# Patient Record
Sex: FEMALE | Race: WHITE | NOT HISPANIC OR LATINO | Employment: UNEMPLOYED | URBAN - METROPOLITAN AREA
[De-identification: names, ages, dates, MRNs, and addresses within clinical notes are randomized per-mention and may not be internally consistent; named-entity substitution may affect disease eponyms.]

---

## 2017-05-24 ENCOUNTER — AMBULATORY - HEALTHEAST (OUTPATIENT)
Dept: PALLIATIVE MEDICINE | Facility: OTHER | Age: 52
End: 2017-05-24

## 2017-05-24 DIAGNOSIS — G89.4 CHRONIC PAIN SYNDROME: ICD-10-CM

## 2017-10-20 ENCOUNTER — RECORDS - HEALTHEAST (OUTPATIENT)
Dept: LAB | Facility: CLINIC | Age: 52
End: 2017-10-20

## 2017-10-20 LAB
CHOLEST SERPL-MCNC: 173 MG/DL
FASTING STATUS PATIENT QL REPORTED: NORMAL
HDLC SERPL-MCNC: 65 MG/DL
LDLC SERPL CALC-MCNC: 88 MG/DL
TRIGL SERPL-MCNC: 98 MG/DL

## 2017-11-30 ENCOUNTER — AMBULATORY - HEALTHEAST (OUTPATIENT)
Dept: PALLIATIVE MEDICINE | Facility: OTHER | Age: 52
End: 2017-11-30

## 2017-11-30 DIAGNOSIS — G89.29 CHRONIC KNEE PAIN: ICD-10-CM

## 2017-11-30 DIAGNOSIS — G89.29 CHRONIC BACK PAIN: ICD-10-CM

## 2017-11-30 DIAGNOSIS — M54.9 CHRONIC BACK PAIN: ICD-10-CM

## 2017-11-30 DIAGNOSIS — M25.569 CHRONIC KNEE PAIN: ICD-10-CM

## 2017-12-15 ENCOUNTER — AMBULATORY - HEALTHEAST (OUTPATIENT)
Dept: BEHAVIORAL HEALTH | Facility: CLINIC | Age: 52
End: 2017-12-15

## 2017-12-15 DIAGNOSIS — F33.1 MAJOR DEPRESSIVE DISORDER, RECURRENT EPISODE, MODERATE (H): ICD-10-CM

## 2018-02-05 ENCOUNTER — RECORDS - HEALTHEAST (OUTPATIENT)
Dept: LAB | Facility: CLINIC | Age: 53
End: 2018-02-05

## 2018-02-06 LAB
AMPHETAMINES UR QL SCN: ABNORMAL
BARBITURATES UR QL: ABNORMAL
BENZODIAZ UR QL: ABNORMAL
CANNABINOIDS UR QL SCN: ABNORMAL
COCAINE UR QL: ABNORMAL
CREAT UR-MCNC: 60.1 MG/DL
OPIATES UR QL SCN: ABNORMAL
OXYCODONE UR QL: ABNORMAL
PCP UR QL SCN: ABNORMAL

## 2018-06-20 ENCOUNTER — HOSPITAL ENCOUNTER (EMERGENCY)
Facility: CLINIC | Age: 53
Discharge: HOME | End: 2018-06-20
Attending: EMERGENCY MEDICINE | Admitting: EMERGENCY MEDICINE
Payer: COMMERCIAL

## 2018-06-20 VITALS
OXYGEN SATURATION: 99 % | DIASTOLIC BLOOD PRESSURE: 79 MMHG | TEMPERATURE: 98.8 F | SYSTOLIC BLOOD PRESSURE: 136 MMHG | RESPIRATION RATE: 16 BRPM | HEART RATE: 116 BPM | HEIGHT: 64 IN
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
## 2018-06-20 DIAGNOSIS — R59.1 LA (LYMPHADENOPATHY): ICD-10-CM

## 2018-06-20 DIAGNOSIS — S00.06XA TICK BITE OF SCALP, INITIAL ENCOUNTER: ICD-10-CM

## 2018-06-20 PROCEDURE — G0463 HOSPITAL OUTPT CLINIC VISIT: HCPCS

## 2018-06-20 PROCEDURE — 99281 EMR DPT VST MAYX REQ PHY/QHP: CPT

## 2018-06-20 PROCEDURE — 99213 OFFICE O/P EST LOW 20 MIN: CPT | Mod: Z6 | Performed by: EMERGENCY MEDICINE

## 2018-06-20 RX ORDER — IBUPROFEN 800 MG/1
800 TABLET, FILM COATED ORAL EVERY 8 HOURS PRN
Qty: 30 TABLET | Refills: 0 | Status: SHIPPED | OUTPATIENT
Start: 2018-06-20 | End: 2018-06-28

## 2018-06-20 RX ORDER — DOXYCYCLINE 100 MG/1
100 CAPSULE ORAL 2 TIMES DAILY
Qty: 28 CAPSULE | Refills: 0 | Status: SHIPPED | OUTPATIENT
Start: 2018-06-20 | End: 2018-07-04

## 2018-06-20 NOTE — DISCHARGE INSTRUCTIONS
"  Tick Bites  Ticks are small arachnids that feed on the blood of rodents, rabbits, birds, deer, dogs, and people. A tick bite may cause a reaction like a spider bite. You may have redness, itching, and slight swelling at the site. Sometimes you may have no reaction where the tick bit you.  Ticks may gorge themselves for days before you find and remove them. The bites themselves aren't cause for concern. But ticks can carry and pass on illnesses such as Lyme disease and [place] spotted fever. Both diseases begin with a rash and symptoms similar to the flu. In advanced stages, these diseases can be quite serious.     A \"bull's eye\" rash is a common symptom of Lyme disease.   When to go to the emergency room (ER)  Not all ticks carry disease. And a tick must stay attached for at least 24 hours to infect you. If you find a tick, don't panic. Try to carefully remove it with tweezers. Grasp the tick near its head and pull without twisting. If you can't easily dislodge the tick or if you leave the head in your skin, get medical care right away.  What to expect in the ER    The tick or any parts of the tick will be removed and the bite will be cleaned.    To prevent disease, you may be given antibiotics. Both Lyme disease and [place] spotted fever respond quickly to these medicines.    You may be asked to see your healthcare provider for a blood test to check for Lyme disease.  Follow-up care  Some states and [facility] have services that test ticks for Lyme disease and other diseases. Check with your local officials to see if this service is available in your area.  If you remove a tick yourself, watch for signs of a tick-borne illness. Symptoms may show up within a few days or weeks after a bite. Call your healthcare provider if you notice any of the following:    Rash. The rash may spread outward in a ring from a hard white lump. Or it may move up your arms and legs to your chest.    Chills and fever    Body " aches and joint pain    Severe headache  Date Last Reviewed: 12/1/2016 2000-2017 [employer]. [address] [place] [address], [place] [address]. All rights reserved. This information is not intended as a substitute for professional medical care. Always follow your healthcare professional's instructions.

## 2018-06-20 NOTE — ED NOTES
"Pt removed a tick from her head \" 2-3 days ago \" pt has dull HA, \" with multiple lumps and bumps all over my head \" has been taking ibuprofen for HA, some relief  "

## 2018-06-20 NOTE — ED AVS SNAPSHOT
" [facility] Emergency Department    [address]    [facility] [address]    Phone:  [phone]    Fax:  [phone]                                       Sujata Pickard   MRN: [id number]    Department:  [facility] Emergency Department   Date of Visit:  6/20/2018           Patient Information     Date Of Birth          1965        Your diagnoses for this visit were:     Tick bite of scalp, initial encounter     LA (lymphadenopathy)        You were seen by Forrest Steinberg MD.      Follow-up Information     Follow up with Elidia Hdez    Specialty:  Family Practice    Why:  As needed    Contact information:    [facility]  [address]  [place] [address]  [phone]          Discharge Instructions         Tick Bites  Ticks are small arachnids that feed on the blood of rodents, rabbits, birds, deer, dogs, and people. A tick bite may cause a reaction like a spider bite. You may have redness, itching, and slight swelling at the site. Sometimes you may have no reaction where the tick bit you.  Ticks may gorge themselves for days before you find and remove them. The bites themselves aren't cause for concern. But ticks can carry and pass on illnesses such as Lyme disease and [place] spotted fever. Both diseases begin with a rash and symptoms similar to the flu. In advanced stages, these diseases can be quite serious.     A \"bull's eye\" rash is a common symptom of Lyme disease.   When to go to the emergency room (ER)  Not all ticks carry disease. And a tick must stay attached for at least 24 hours to infect you. If you find a tick, don't panic. Try to carefully remove it with tweezers. Grasp the tick near its head and pull without twisting. If you can't easily dislodge the tick or if you leave the head in your skin, get medical care right away.  What to expect in the ER    The tick or any parts of the tick will be removed and the bite will be cleaned.    To prevent " disease, you may be given antibiotics. Both Lyme disease and [place] spotted fever respond quickly to these medicines.    You may be asked to see your healthcare provider for a blood test to check for Lyme disease.  Follow-up care  Some [facility] and [facility] have services that test ticks for Lyme disease and other diseases. Check with your local officials to see if this service is available in your area.  If you remove a tick yourself, watch for signs of a tick-borne illness. Symptoms may show up within a few days or weeks after a bite. Call your healthcare provider if you notice any of the following:    Rash. The rash may spread outward in a ring from a hard white lump. Or it may move up your arms and legs to your chest.    Chills and fever    Body aches and joint pain    Severe headache  Date Last Reviewed: 12/1/2016 2000-2017 [employer]. [address] [place] [address]. All rights reserved. This information is not intended as a substitute for professional medical care. Always follow your healthcare professional's instructions.          Discharge References/Attachments     LYMPHADENOPATHY (ENGLISH)      24 Hour Appointment Hotline       To make an appointment at any [facility] clinic, call [phone] ([phone]). If you don't have a family doctor or clinic, we will help you find one. [facility] clinics are conveniently located to serve the needs of you and your family.             Review of your medicines      START taking        Dose / Directions Last dose taken    doxycycline 100 MG capsule   Commonly known as:  VIBRAMYCIN   Dose:  100 mg   Quantity:  28 capsule        Take 1 capsule (100 mg) by mouth 2 times daily for 14 days   Refills:  0          Our records show that you are taking the medicines listed below. If these are incorrect, please call your family doctor or clinic.        Dose / Directions Last dose taken    aspirin 81 MG tablet   Dose:  81 mg        Take 81 mg  by mouth daily   Refills:  0        ATENOLOL PO   Dose:  50 mg        Take 50 mg by mouth daily   Refills:  0        fentaNYL 12 mcg/hr 72 hr patch   Commonly known as:  DURAGESIC   Dose:  1 patch        Place 1 patch onto the skin every 72 hours   Refills:  0        LIPITOR PO   Dose:  20 mg        Take 20 mg by mouth daily   Refills:  0        LISINOPRIL PO   Dose:  20 mg        Take 20 mg by mouth daily   Refills:  0        OMEPRAZOLE PO   Dose:  20 mg        Take 20 mg by mouth daily   Refills:  0        PROPRANOLOL HCL PO   Indication:  High Blood Pressure Disorder        Refills:  0        SEROQUEL PO   Indication:  Trouble Sleeping        Take by mouth At Bedtime   Refills:  0        XANAX PO   Dose:  1 mg        Take 1 mg by mouth Every 6-8 hours as needed   Refills:  0          ASK your doctor about these medications        Dose / Directions Last dose taken    * IBUPROFEN PO   Dose:  600 mg   What changed:  Another medication with the same name was added. Make sure you understand how and when to take each.   Ask about: Which instructions should I use?        Take 600 mg by mouth every 6 hours as needed   Refills:  0        * ibuprofen 800 MG tablet   Commonly known as:  ADVIL/MOTRIN   Dose:  800 mg   What changed:  You were already taking a medication with the same name, and this prescription was added. Make sure you understand how and when to take each.   Quantity:  30 tablet   Ask about: Which instructions should I use?        Take 1 tablet (800 mg) by mouth every 8 hours as needed for moderate pain   Refills:  0        * Notice:  This list has 2 medication(s) that are the same as other medications prescribed for you. Read the directions carefully, and ask your doctor or other care provider to review them with you.            Prescriptions were sent or printed at these locations (2 Prescriptions)                   [facility] [place], [place] - [address]   [address], [place]  "[place] [address]    Telephone:  [phone]   Fax:  [phone]   Hours:                  E-Prescribed (2 of 2)         doxycycline (VIBRAMYCIN) 100 MG capsule               ibuprofen (ADVIL/MOTRIN) 800 MG tablet                Orders Needing Specimen Collection     None      Pending Results     No orders found from 2018 to 2018.            Pending Culture Results     No orders found from 2018 to 2018.            Pending Results Instructions     If you had any lab results that were not finalized at the time of your Discharge, you can call the ED Lab Result RN at [phone]. You will be contacted by this team for any positive Lab results or changes in treatment. The nurses are available 7 days a week from 10A to 6:30P.  You can leave a message 24 hours per day and they will return your call.        Test Results From Your Hospital Stay               Thank you for choosing [facility]       Thank you for choosing [facility] for your care. Our goal is always to provide you with excellent care. Hearing back from our patients is one way we can continue to improve our services. Please take a few minutes to complete the written survey that you may receive in the mail after you visit with us. Thank you!        [employer] Information     [employer] lets you send messages to your doctor, view your test results, renew your prescriptions, schedule appointments and more. To sign up, go to [email or web address] . Click on \"Log in\" on the left side of the screen, which will take you to the Welcome page. Then click on \"Sign up Now\" on the right side of the page.     You will be asked to enter the access code listed below, as well as some personal information. Please follow the directions to create your username and password.     Your access code is: [email or web address]  Expires: 2018  6:28 PM     Your access code will  in 90 days. If you need help or a new code, please call your [facility] clinic or [phone].      "   Care EveryWhere ID     This is your Care EveryWhere ID. This could be used by other organizations to access your [facility] medical records  [id number]        Equal Access to Services     CONSTANCE CASTILLO : Jackelyn Tracy, axel penny, conrado kim, pierre segal. So [facility] [phone].    ATENCIÓN: Si habla español, tiene a augustine disposición servicios gratuitos de asistencia lingüística. Llame al [phone].    We comply with applicable federal civil rights laws and [place] laws. We do not discriminate on the basis of race, color, national origin, age, disability, sex, sexual orientation, or gender identity.            After Visit Summary       This is your record. Keep this with you and show to your community pharmacist(s) and doctor(s) at your next visit.

## 2018-06-20 NOTE — ED PROVIDER NOTES
"  History     Chief Complaint   Patient presents with     Headache     has multiple lumps on head, one is possibly from a tick bite. has a headache, does not have hx of migraines     HPI  Sujata Pickard is a 52 year old female who presents with complaints of tender lumps in her neck.  Patient states about a week ago they removed a engorged tick from her scalp in the occiput.  Since that time she said swelling discomfort in the area and now was noted lumps in the left side of her neck.  Associated headache.  No visual change.  No difficulty speech or swallowing.  Denies chest pain or shortness of breath.  No lightheadedness or generalized weakness.  No abdominal pain, nausea vomiting.  No other skin rash or lesions.  No fever or chills. Patient claims tetanus is up-to-date.  No treatment for symptoms prior to arrival.  Headache is a dull ache in moderate intensity.  No history of migraine.  She denies any other skin rash or lesion.  She has been taking ibuprofen with improvement.  She is a daily smoker.    Problem List:    There are no active problems to display for this patient.       Past Medical History:    Past Medical History:   Diagnosis Date     Arthritis        Past Surgical History:    Past Surgical History:   Procedure Laterality Date     GYN SURGERY         Family History:    No family history on file.    Social History:  Marital Status:   [2]  Social History   Substance Use Topics     Smoking status: Current Every Day Smoker     Types: Cigarettes     Smokeless tobacco: Not on file     Alcohol use Yes      Comment: occas.        Medications:      doxycycline (VIBRAMYCIN) 100 MG capsule   ibuprofen (ADVIL/MOTRIN) 800 MG tablet         Review of Systems other systems reviewed and are negative.  Physical Exam   BP: 136/79  Pulse: 116  Temp: 98.8  F (37.1  C)  Resp: 16  Height: 162.6 cm (5' 4\")  SpO2: 99 %      Physical Exam alert cooperative female in mild to moderate distress.  HEENT shows no " facial swelling or asymmetry.  Ears are clear bilaterally.  Eyes show no injection or mattering.  Nasal packs are patent.  Orally there is no tonsillar hypertrophy or exudate.  Speech is clear and concise.  On scalp exam she has a mildly tender, erythematous nonfluctuant area in the left occiput.  No other scalp lesions are noted.  On neck exam she has tender posterior adenopathy on the left.  No meningismus.  Lungs and cardiac auscultation were normal.  Neurologically nonfocal.    ED Course     ED Course     Procedures               Critical Care time:  none               No results found for this or any previous visit (from the past 24 hour(s)).    Medications - No data to display    Assessments & Plan (with Medical Decision Making)   Patient is a 52-year-old female presents with headache and tender lymph nodes on the left side of her neck.  Patient states that about a week ago they removed the engorged tick from the occiput of her scalp.  Since that time she has had a tender area over that site.  Mild associated headache in the area.  And now is noted some bumps on her left neck which are tender.  She denies any fever chills.  No change in her vision, hearing, speech or swallowing.  Symptoms improved with ibuprofen.  She denies chest pain shortness of breath.  No generalized weakness or lightheadedness.  No focal neurologic symptoms.  No skin rash or lesions.  Tetanus up-to-date.  On exam she is afebrile and vitally stable except mildly tachycardic.  She has a mildly tender area on the left occiput which is nonfluctuant but mildly erythematous.  Neck exam reveals no meningismus or stridor.  She has tender posterior cervical adenopathy on the left.  Neurologically she has a nonfocal exam.  No other skin rashes are appreciated.  Suspect she has erythema migrans with associated cervical lymphadenopathy.  Is given information on both tick bite and lymphadenopathy.  Doxycycline as directed.  Ibuprofen for pain.   Reasons to return to the emergency room were discussed.  Patient and her significant other are in agreement with the plan.  I have reviewed the nursing notes.    I have reviewed the findings, diagnosis, plan and need for follow up with the patient.       Discharge Medication List as of 6/20/2018  6:30 PM      START taking these medications    Details   doxycycline (VIBRAMYCIN) 100 MG capsule Take 1 capsule (100 mg) by mouth 2 times daily for 14 days, Disp-28 capsule, R-0, E-Prescribe      !! ibuprofen (ADVIL/MOTRIN) 800 MG tablet Take 1 tablet (800 mg) by mouth every 8 hours as needed for moderate pain, Disp-30 tablet, R-0, E-Prescribe       !! - Potential duplicate medications found. Please discuss with provider.          Final diagnoses:   Tick bite of scalp, initial encounter   LA (lymphadenopathy)       6/20/2018   [facility] EMERGENCY DEPARTMENT     Forrest Steinberg MD  06/20/18 6576

## 2018-06-20 NOTE — ED AVS SNAPSHOT
[facility] Emergency Department    [address] [address]    Phone:  [phone]    Fax:  [phone]                                       Sujata Pickard   MRN: [id number]    Department:  [facility] Emergency Department   Date of Visit:  6/20/2018           After Visit Summary Signature Page     I have received my discharge instructions, and my questions have been answered. I have discussed any challenges I see with this plan with the nurse or doctor.    ..........................................................................................................................................  Patient/Patient Representative Signature      ..........................................................................................................................................  Patient Representative Print Name and Relationship to Patient    ..................................................               ................................................  Date                                            Time    ..........................................................................................................................................  Reviewed by Signature/Title    ...................................................              ..............................................  Date                                                            Time

## 2021-05-24 ENCOUNTER — RECORDS - HEALTHEAST (OUTPATIENT)
Dept: ADMINISTRATIVE | Facility: CLINIC | Age: 56
End: 2021-05-24

## 2021-06-13 ENCOUNTER — HOSPITAL ENCOUNTER (EMERGENCY)
Facility: CLINIC | Age: 56
Discharge: HOME | End: 2021-06-13
Attending: FAMILY MEDICINE | Admitting: FAMILY MEDICINE

## 2021-06-13 VITALS
RESPIRATION RATE: 16 BRPM | HEIGHT: 63 IN | TEMPERATURE: 97.9 F | DIASTOLIC BLOOD PRESSURE: 79 MMHG | SYSTOLIC BLOOD PRESSURE: 129 MMHG | WEIGHT: 150 LBS | BODY MASS INDEX: 26.58 KG/M2 | HEART RATE: 89 BPM | OXYGEN SATURATION: 97 %

## 2021-06-13 DIAGNOSIS — L03.116 LEFT LEG CELLULITIS: ICD-10-CM

## 2021-06-13 DIAGNOSIS — M79.89 SWELLING OF BOTH LOWER EXTREMITIES: ICD-10-CM

## 2021-06-13 PROCEDURE — 99283 EMERGENCY DEPT VISIT LOW MDM: CPT | Performed by: FAMILY MEDICINE

## 2021-06-13 PROCEDURE — 99284 EMERGENCY DEPT VISIT MOD MDM: CPT | Performed by: FAMILY MEDICINE

## 2021-06-13 RX ORDER — CEPHALEXIN 500 MG/1
500 CAPSULE ORAL 4 TIMES DAILY
Qty: 40 CAPSULE | Refills: 0 | Status: SHIPPED | OUTPATIENT
Start: 2021-06-13 | End: 2023-08-04

## 2021-06-13 ASSESSMENT — MIFFLIN-ST. JEOR: SCORE: 1234.53

## 2021-06-14 NOTE — ED PROVIDER NOTES
"  HPI   The patient is a 55-year-old female presenting with concern for a recurring right leg wound that has now worsened.  She bumped her left anterior leg on the edge of a coffee table about 6 months ago.  She tells me that it has been recurring every time she bumps it.  Over the past few days she describes increased redness and swelling and tenderness.  No purulent drainage.  No fever or other systemic symptoms.  Pain moderate but severe with palpation.        Allergies:  Allergies   Allergen Reactions     Levaquin [Levofloxacin]      Sulfa Drugs      Problem List:    There are no active problems to display for this patient.     Past Medical History:    Past Medical History:   Diagnosis Date     Arthritis      Past Surgical History:    Past Surgical History:   Procedure Laterality Date     GYN SURGERY       Family History:    No family history on file.  Social History:  Marital Status:   [2]  Social History     Tobacco Use     Smoking status: Current Every Day Smoker     Types: Cigarettes   Substance Use Topics     Alcohol use: Yes     Comment: occas.     Drug use: Yes     Comment: benzo and opiate abuse      Medications:    cephALEXin (KEFLEX) 500 MG capsule      Review of Systems   All other systems reviewed and are negative.      PE   BP: (!) 177/120  Pulse: 107  Temp: 97.9  F (36.6  C)  Resp: 16  Height: 160 cm (5' 3\")  Weight: 68 kg (150 lb)  SpO2: 98 %  Physical Exam  Vitals signs reviewed.   Constitutional:       General: She is not in acute distress.     Appearance: She is well-developed.   HENT:      Head: Normocephalic and atraumatic.      Right Ear: External ear normal.      Left Ear: External ear normal.      Nose: Nose normal.      Mouth/Throat:      Mouth: Mucous membranes are moist.      Pharynx: Oropharynx is clear.   Eyes:      Extraocular Movements: Extraocular movements intact.      Conjunctiva/sclera: Conjunctivae normal.      Pupils: Pupils are equal, round, and reactive to light. "   Neck:      Musculoskeletal: Normal range of motion.   Cardiovascular:      Rate and Rhythm: Normal rate and regular rhythm.   Pulmonary:      Effort: Pulmonary effort is normal.   Musculoskeletal: Normal range of motion.   Skin:     General: Skin is warm and dry.      Comments: See photo.  No fluctuance.  Extremely tender.  No drainage.   Neurological:      Mental Status: She is alert and oriented to person, place, and time.   Psychiatric:         Behavior: Behavior normal.             ED COURSE and [facility]   2147.  The patient has recurring open wound.  Has pain, redness, tenderness.  Keflex provided.  Follow-up with family medicine, referral order placed.  Bilateral leg swelling as well, ongoing for months.  Progressively worsened.  No chest pain or shortness of breath.  Low concern for PE.  Low concern for DVT.  She needs broader work-up and treatment options.    LABS  Labs Ordered and Resulted from Time of ED Arrival Up to the Time of Departure from the ED - No data to display    IMAGING  Images reviewed by me.  Radiology report also reviewed.  No orders to display       Procedures    Medications - No data to display      IMPRESSION       ICD-10-CM    1. Left leg cellulitis  L03.116 FAMILY PRACTICE REFERRAL   2. Swelling of both lower extremities  M79.89 FAMILY PRACTICE REFERRAL            Medication List      Started    cephALEXin 500 MG capsule  Commonly known as: KEFLEX  500 mg, Oral, 4 TIMES DAILY                          Eugene Deutsch MD  06/13/21 2142

## 2021-06-14 NOTE — DISCHARGE INSTRUCTIONS
Return to the Emergency Room if the following occurs:     Severely worsened pain, expanding redness and fever, or for any concern at anytime.    Or, follow-up with the following provider as we discussed:     I placed an order for family medicine referral.  Expect a phone call within the next 2 business days to help with scheduling.    Medications discussed:    Keflex.  Start the prescription tonight.    If you received pain-relieving or sedating medication during your time in the ER, avoid alcohol, driving automobiles, or working with machinery.  Also, a responsible adult must stay with you.        Call the Nurse Advice Line at [phone] or [phone] for any concern at anytime.

## 2021-06-14 NOTE — ED NOTES
"Pt c/o would on left upper leg x 1 year, states LLE has been red and swollen but has gotten worse after she bumped the   Wound twice in the past 2 weeks. Pt denies fevers or chills, no risk factors for DVT. Taking ibuprofen for pain with little relief. Described pain as \"burning\".  "

## 2023-08-04 ENCOUNTER — APPOINTMENT (OUTPATIENT)
Dept: CT IMAGING | Facility: CLINIC | Age: 58
End: 2023-08-04
Attending: FAMILY MEDICINE
Payer: MEDICAID

## 2023-08-04 ENCOUNTER — HOSPITAL ENCOUNTER (EMERGENCY)
Facility: CLINIC | Age: 58
Discharge: HOME | End: 2023-08-04
Attending: FAMILY MEDICINE | Admitting: FAMILY MEDICINE
Payer: MEDICAID

## 2023-08-04 VITALS
OXYGEN SATURATION: 98 % | SYSTOLIC BLOOD PRESSURE: 160 MMHG | TEMPERATURE: 99 F | BODY MASS INDEX: 32.64 KG/M2 | HEIGHT: 63 IN | DIASTOLIC BLOOD PRESSURE: 94 MMHG | WEIGHT: 184.2 LBS | RESPIRATION RATE: 16 BRPM | HEART RATE: 106 BPM

## 2023-08-04 DIAGNOSIS — H53.9 VISION CHANGES: ICD-10-CM

## 2023-08-04 DIAGNOSIS — R00.0 SINUS TACHYCARDIA: ICD-10-CM

## 2023-08-04 DIAGNOSIS — R03.0 ELEVATED BLOOD PRESSURE READING: ICD-10-CM

## 2023-08-04 DIAGNOSIS — H43.392 FLOATERS IN VISUAL FIELD, LEFT: ICD-10-CM

## 2023-08-04 LAB
ANION GAP SERPL CALCULATED.3IONS-SCNC: 16 MMOL/L (ref 7–15)
APTT PPP: 26 SECONDS (ref 22–38)
BASOPHILS # BLD AUTO: 0.1 10E3/UL (ref 0–0.2)
BASOPHILS NFR BLD AUTO: 1 %
BUN SERPL-MCNC: 15.6 MG/DL (ref 8–23)
CALCIUM SERPL-MCNC: 9.8 MG/DL (ref 8.6–10)
CHLORIDE SERPL-SCNC: 103 MMOL/L (ref 98–107)
CREAT SERPL-MCNC: 0.72 MG/DL (ref 0.51–0.95)
CRP SERPL-MCNC: <3 MG/L
DEPRECATED HCO3 PLAS-SCNC: 21 MMOL/L (ref 22–29)
EOSINOPHIL # BLD AUTO: 0.1 10E3/UL (ref 0–0.7)
EOSINOPHIL NFR BLD AUTO: 1 %
ERYTHROCYTE [DISTWIDTH] IN BLOOD BY AUTOMATED COUNT: 13.8 % (ref 10–15)
ERYTHROCYTE [SEDIMENTATION RATE] IN BLOOD BY WESTERGREN METHOD: 23 MM/HR (ref 0–30)
GFR SERPL CREATININE-BSD FRML MDRD: >90 ML/MIN/1.73M2
GLUCOSE BLDC GLUCOMTR-MCNC: 89 MG/DL (ref 70–99)
GLUCOSE SERPL-MCNC: 98 MG/DL (ref 70–99)
HCT VFR BLD AUTO: 41.2 % (ref 35–47)
HGB BLD-MCNC: 13.5 G/DL (ref 11.7–15.7)
HOLD SPECIMEN: NORMAL
IMM GRANULOCYTES # BLD: 0 10E3/UL
IMM GRANULOCYTES NFR BLD: 0 %
INR PPP: 0.98 (ref 0.85–1.15)
LYMPHOCYTES # BLD AUTO: 2.2 10E3/UL (ref 0.8–5.3)
LYMPHOCYTES NFR BLD AUTO: 22 %
MCH RBC QN AUTO: 29.1 PG (ref 26.5–33)
MCHC RBC AUTO-ENTMCNC: 32.8 G/DL (ref 31.5–36.5)
MCV RBC AUTO: 89 FL (ref 78–100)
MONOCYTES # BLD AUTO: 0.9 10E3/UL (ref 0–1.3)
MONOCYTES NFR BLD AUTO: 9 %
NEUTROPHILS # BLD AUTO: 6.5 10E3/UL (ref 1.6–8.3)
NEUTROPHILS NFR BLD AUTO: 67 %
NRBC # BLD AUTO: 0 10E3/UL
NRBC BLD AUTO-RTO: 0 /100
PLATELET # BLD AUTO: 291 10E3/UL (ref 150–450)
POTASSIUM SERPL-SCNC: 4 MMOL/L (ref 3.4–5.3)
RBC # BLD AUTO: 4.64 10E6/UL (ref 3.8–5.2)
SODIUM SERPL-SCNC: 140 MMOL/L (ref 136–145)
T4 FREE SERPL-MCNC: 0.95 NG/DL (ref 0.9–1.7)
TROPONIN T SERPL HS-MCNC: 14 NG/L
TSH SERPL DL<=0.005 MIU/L-ACNC: 5.73 UIU/ML (ref 0.3–4.2)
WBC # BLD AUTO: 9.7 10E3/UL (ref 4–11)

## 2023-08-04 PROCEDURE — 250N000011 HC RX IP 250 OP 636: Performed by: FAMILY MEDICINE

## 2023-08-04 PROCEDURE — 84443 ASSAY THYROID STIM HORMONE: CPT | Performed by: FAMILY MEDICINE

## 2023-08-04 PROCEDURE — 70450 CT HEAD/BRAIN W/O DYE: CPT | Mod: XU

## 2023-08-04 PROCEDURE — 99284 EMERGENCY DEPT VISIT MOD MDM: CPT | Mod: 25 | Performed by: FAMILY MEDICINE

## 2023-08-04 PROCEDURE — 70496 CT ANGIOGRAPHY HEAD: CPT

## 2023-08-04 PROCEDURE — 93010 ELECTROCARDIOGRAM REPORT: CPT | Performed by: FAMILY MEDICINE

## 2023-08-04 PROCEDURE — 80048 BASIC METABOLIC PNL TOTAL CA: CPT | Performed by: FAMILY MEDICINE

## 2023-08-04 PROCEDURE — 250N000013 HC RX MED GY IP 250 OP 250 PS 637: Performed by: FAMILY MEDICINE

## 2023-08-04 PROCEDURE — 36415 COLL VENOUS BLD VENIPUNCTURE: CPT | Performed by: FAMILY MEDICINE

## 2023-08-04 PROCEDURE — 85652 RBC SED RATE AUTOMATED: CPT | Performed by: FAMILY MEDICINE

## 2023-08-04 PROCEDURE — 84484 ASSAY OF TROPONIN QUANT: CPT | Performed by: FAMILY MEDICINE

## 2023-08-04 PROCEDURE — 85730 THROMBOPLASTIN TIME PARTIAL: CPT | Performed by: FAMILY MEDICINE

## 2023-08-04 PROCEDURE — 82962 GLUCOSE BLOOD TEST: CPT

## 2023-08-04 PROCEDURE — 93005 ELECTROCARDIOGRAM TRACING: CPT | Performed by: FAMILY MEDICINE

## 2023-08-04 PROCEDURE — 250N000009 HC RX 250: Performed by: FAMILY MEDICINE

## 2023-08-04 PROCEDURE — 85610 PROTHROMBIN TIME: CPT | Performed by: FAMILY MEDICINE

## 2023-08-04 PROCEDURE — 86140 C-REACTIVE PROTEIN: CPT | Performed by: FAMILY MEDICINE

## 2023-08-04 PROCEDURE — 84439 ASSAY OF FREE THYROXINE: CPT | Performed by: FAMILY MEDICINE

## 2023-08-04 PROCEDURE — 70498 CT ANGIOGRAPHY NECK: CPT

## 2023-08-04 PROCEDURE — 99285 EMERGENCY DEPT VISIT HI MDM: CPT | Mod: 25 | Performed by: FAMILY MEDICINE

## 2023-08-04 PROCEDURE — 85025 COMPLETE CBC W/AUTO DIFF WBC: CPT | Performed by: FAMILY MEDICINE

## 2023-08-04 RX ORDER — IOPAMIDOL 755 MG/ML
68 INJECTION, SOLUTION INTRAVASCULAR ONCE
Status: COMPLETED | OUTPATIENT
Start: 2023-08-04 | End: 2023-08-04

## 2023-08-04 RX ORDER — SODIUM CHLORIDE 9 MG/ML
1000 INJECTION, SOLUTION INTRAVENOUS CONTINUOUS
Status: DISCONTINUED | OUTPATIENT
Start: 2023-08-04 | End: 2023-08-04 | Stop reason: HOSPADM

## 2023-08-04 RX ORDER — ACETAMINOPHEN 325 MG/1
650 TABLET ORAL ONCE
Status: COMPLETED | OUTPATIENT
Start: 2023-08-04 | End: 2023-08-04

## 2023-08-04 RX ADMIN — SODIUM CHLORIDE 100 ML: 9 INJECTION, SOLUTION INTRAVENOUS at 20:13

## 2023-08-04 RX ADMIN — IOPAMIDOL 68 ML: 755 INJECTION, SOLUTION INTRAVENOUS at 20:13

## 2023-08-04 RX ADMIN — ACETAMINOPHEN 650 MG: 325 TABLET, FILM COATED ORAL at 21:34

## 2023-08-04 ASSESSMENT — ENCOUNTER SYMPTOMS
CONSTIPATION: 0
VOMITING: 0
CHILLS: 0
PALPITATIONS: 0
DIAPHORESIS: 0
BLOOD IN STOOL: 0
COUGH: 0
ABDOMINAL PAIN: 0
DIARRHEA: 0
WHEEZING: 0
FREQUENCY: 0
SHORTNESS OF BREATH: 0
FEVER: 0
HEADACHES: 1
DYSURIA: 0
SINUS PRESSURE: 0
NAUSEA: 0
SORE THROAT: 0

## 2023-08-04 ASSESSMENT — VISUAL ACUITY
OD: 20/50
OS: 20/50
OU: NORMAL ACUITY;BASELINE

## 2023-08-04 ASSESSMENT — ACTIVITIES OF DAILY LIVING (ADL): ADLS_ACUITY_SCORE: 35

## 2023-08-05 NOTE — ED PROVIDER NOTES
History     Chief Complaint   Patient presents with    Eye Problem     Floater in left eye since 16:00. Describes as a mist.      HPI  Sujata Pickard is a 57 year old female who presents with onset at 1600 hrs. today of floaters starting in the left visual fields and floating into her more central vision from this.  This was an abrupt onset.  There was no complete vision loss.  There is more floaters throughout the fields.  She has had no history of retinal detachment vitreous detachment or other eye concerns.  She notes that her vision is chronically more blurry and she needs to use cheaters.  She has had blood pressure elevated in the past and was previously on beta-blocker and lisinopril but is no longer on blood pressure medications and has not been for about 5 years.    Most of her symptoms sounded as if these were vitreous detachment but then she noted that she was having intermittent episodes of a curtain like closure first vertically and then horizontally from the left side.  These were very brief and not persistent.  There were no other associated neurologic changes.  She did also mention a headache that occurred later.  Not thunderclap headache and no associated significant nausea vomiting or light sensitivity.  No ocular migraine history and no scotomata  Allergies:  Allergies   Allergen Reactions    Levaquin [Levofloxacin]     Sulfa Antibiotics        Problem List:    There are no problems to display for this patient.       Past Medical History:    Past Medical History:   Diagnosis Date    Arthritis        Past Surgical History:    Past Surgical History:   Procedure Laterality Date    GYN SURGERY         Family History:    No family history on file.    Social History:  Marital Status:   [2]  Social History     Tobacco Use    Smoking status: Every Day     Types: Cigarettes   Substance Use Topics    Alcohol use: Yes     Comment: occas.    Drug use: Yes     Comment: benzo and opiate abuse     "    Medications:    No current outpatient medications on file.        Review of Systems   Constitutional:  Negative for chills, diaphoresis and fever.   HENT:  Negative for ear pain, sinus pressure and sore throat.    Eyes:  Positive for visual disturbance.   Respiratory:  Negative for cough, shortness of breath and wheezing.    Cardiovascular:  Negative for chest pain and palpitations.   Gastrointestinal:  Negative for abdominal pain, blood in stool, constipation, diarrhea, nausea and vomiting.   Genitourinary:  Negative for dysuria, frequency and urgency.   Skin:  Negative for rash.   Neurological:  Positive for headaches.   All other systems reviewed and are negative.      Physical Exam   BP: (!) 186/93  Pulse: (!) 124  Temp: 99.1  F (37.3  C)  Resp: 18  Height: 160 cm (5' 3\")  Weight: 83.6 kg (184 lb 3.2 oz)  SpO2: 97 %      Physical Exam  Constitutional:       General: She is in acute distress.   HENT:      Head: Atraumatic.   Eyes:      Extraocular Movements: Extraocular movements intact.      Conjunctiva/sclera: Conjunctivae normal.      Pupils: Pupils are equal, round, and reactive to light.   Cardiovascular:      Rate and Rhythm: Normal rate and regular rhythm.      Heart sounds: No murmur heard.  Pulmonary:      Effort: Pulmonary effort is normal. No respiratory distress.      Breath sounds: Normal breath sounds. No stridor. No wheezing or rhonchi.   Abdominal:      General: Abdomen is flat. There is no distension.      Palpations: Abdomen is soft. There is no mass.      Tenderness: There is no abdominal tenderness. There is no guarding.   Musculoskeletal:      Cervical back: Neck supple.      Right lower leg: No edema.      Left lower leg: No edema.   Skin:     Coloration: Skin is not pale.      Findings: No rash.   Neurological:      General: No focal deficit present.      Mental Status: She is alert and oriented to person, place, and time.      Cranial Nerves: No cranial nerve deficit.      Sensory: No " sensory deficit.      Motor: No weakness.      Coordination: Coordination normal.         ED Course                 Procedures                 EKG Interpretation:      Interpreted by Rui Emmanuel MD  EKG done at 2030 hrs. demonstrates a sinus rhythm at 120 bpm with a normal axis.  There is no ST change.  No T wave changes.  There is a poor R progression V1 through V4.  No ectopy.  Normal conduction.  Impression sinus rhythm at 120 bpm no significant acute changes other than a sinus tachycardia without significant acute other changes      Critical Care time:  none     The patient has stroke symptoms:         ED Stroke specific documentation           NIHSS PDF     Patient last known well time: 1600  ED Provider first to bedside at: 1950  CT Results received at: 2025    Thrombolytics:   Not given due to:   - minor/isolated/quickly resolving symptoms    If treating with thrombolytics: Ensure SBP<180 and DBP<105 prior to treatment with thrombolytics.  Administering thrombolytics after treatment with IV labetalol, hydralazine, or nicardipine is reasonable once BP control is established.    Endovascular Retrieval:  Not initiated due to absence of proximal vessel occlusion    [employer] Stroke Scale (Baseline)  Time Performed: 1950     Score    Level of consciousness: (0)   Alert, keenly responsive    LOC questions: (0)   Answers both questions correctly    LOC commands: (0)   Performs both tasks correctly    Best gaze: (0)   Normal    Visual: (0)   No visual loss    Facial palsy: (0)   Normal symmetrical movements    Motor arm (left): (0)   No drift    Motor arm (right): (0)   No drift    Motor leg (left): (0)   No drift    Motor leg (right): (0)   No drift    Limb ataxia: (0)   Absent    Sensory: (0)   Normal- no sensory loss    Best language: (0)   Normal- no aphasia    Dysarthria: (0)   Normal    Extinction and inattention: (0)   No abnormality        Total Score:  0        Stroke Mimics were  considered (including migraine headache, seizure disorder, hypoglycemia (or hyperglycemia), head or spinal trauma, CNS infection, Toxin ingestion and shock state (e.g. sepsis) .                 Results for orders placed or performed during the hospital encounter of 08/04/23 (from the past 24 hour(s))   [place] Draw    Narrative    The following orders were created for panel order [place] Draw.  Procedure                               Abnormality         Status                     ---------                               -----------         ------                     Extra Blue Top Tube[[id number]]                              Final result               Extra Red Top Tube[[id number]]                               Final result               Extra Green Top (Lithium...[[id number]]                      Final result               Extra Purple Top Tube[[id number]]                            Final result                 Please view results for these tests on the individual orders.   Extra Blue Top Tube   Result Value Ref Range    Hold Specimen JIC    Extra Red Top Tube   Result Value Ref Range    Hold Specimen JIC    Extra Green Top (Lithium Heparin) Tube   Result Value Ref Range    Hold Specimen JIC    Extra Purple Top Tube   Result Value Ref Range    Hold Specimen JIC    CRP Inflammation   Result Value Ref Range    CRP Inflammation <3.00 <5.00 mg/L   Erythrocyte sedimentation rate auto   Result Value Ref Range    Erythrocyte Sedimentation Rate 23 0 - 30 mm/hr   CBC with platelets differential    Narrative    The following orders were created for panel order CBC with platelets differential.  Procedure                               Abnormality         Status                     ---------                               -----------         ------                     CBC with platelets and d...[[id number]]                      Final result                 Please view results for these tests on the individual orders.   Basic  metabolic panel   Result Value Ref Range    Sodium 140 136 - 145 mmol/L    Potassium 4.0 3.4 - 5.3 mmol/L    Chloride 103 98 - 107 mmol/L    Carbon Dioxide (CO2) 21 (L) 22 - 29 mmol/L    Anion Gap 16 (H) 7 - 15 mmol/L    Urea Nitrogen 15.6 8.0 - 23.0 mg/dL    Creatinine 0.72 0.51 - 0.95 mg/dL    Calcium 9.8 8.6 - 10.0 mg/dL    Glucose 98 70 - 99 mg/dL    GFR Estimate >90 >60 mL/min/1.73m2   INR   Result Value Ref Range    INR 0.98 0.85 - 1.15   Partial thromboplastin time   Result Value Ref Range    aPTT 26 22 - 38 Seconds   Troponin T, High Sensitivity   Result Value Ref Range    Troponin T, High Sensitivity 14 <=14 ng/L   CBC with platelets and differential   Result Value Ref Range    WBC Count 9.7 4.0 - 11.0 10e3/uL    RBC Count 4.64 3.80 - 5.20 10e6/uL    Hemoglobin 13.5 11.7 - 15.7 g/dL    Hematocrit 41.2 35.0 - 47.0 %    MCV 89 78 - 100 fL    MCH 29.1 26.5 - 33.0 pg    MCHC 32.8 31.5 - 36.5 g/dL    RDW 13.8 10.0 - 15.0 %    Platelet Count 291 150 - 450 10e3/uL    % Neutrophils 67 %    % Lymphocytes 22 %    % Monocytes 9 %    % Eosinophils 1 %    % Basophils 1 %    % Immature Granulocytes 0 %    NRBCs per 100 WBC 0 <1 /100    Absolute Neutrophils 6.5 1.6 - 8.3 10e3/uL    Absolute Lymphocytes 2.2 0.8 - 5.3 10e3/uL    Absolute Monocytes 0.9 0.0 - 1.3 10e3/uL    Absolute Eosinophils 0.1 0.0 - 0.7 10e3/uL    Absolute Basophils 0.1 0.0 - 0.2 10e3/uL    Absolute Immature Granulocytes 0.0 <=0.4 10e3/uL    Absolute NRBCs 0.0 10e3/uL   TSH with free T4 reflex   Result Value Ref Range    TSH 5.73 (H) 0.30 - 4.20 uIU/mL   T4 free   Result Value Ref Range    Free T4 0.95 0.90 - 1.70 ng/dL   POC US SOFT TISSUE    Impression    [facility] Procedure Note     Limited Bedside ED Ultrasound for Ocular complaints:    PROCEDURE: PERFORMED BY: Dr. Rui Emmanuel MD  INDICATIONS/SYMPTOM: floaters  PROBE: High frequency linear probe  FINDINGS:  Left eye:     Optic nerve sheath diameter: 0.3 mm   Lens location:  Normal   Retinal detachment: Absent   Posterior chamber hemorrhage: Absent   Intraocular foreign body: Absent  I can see the internal echoes of likely floater type material    INTERPRETATION: Normal US of the Left Eye.  The optic nerve sheath diameter indicated no increased ICP.  The lens location and retina appeared normal.  No posterior chamber hemorrhage was visualized.  No foreign bodies seen other than internal echoes of likely floater.  IMAGE DOCUMENTATION: Images were archived to PACs system.      Glucose by meter   Result Value Ref Range    GLUCOSE BY METER POCT 89 70 - 99 mg/dL   CT Head w/o Contrast    Narrative    EXAM: CT HEAD W/O CONTRAST, CTA HEAD NECK W CONTRAST  LOCATION: [facility]  DATE/TIME: 8/4/2023 8:22 PM CDT    INDICATION: Code stroke; vision problem  COMPARISON: None.  CONTRAST: 68 mL of Isovue 370  TECHNIQUE: Head and neck CT angiogram with IV contrast. Noncontrast head CT followed by axial helical CT images of the head and neck vessels obtained during the arterial phase of intravenous contrast administration. Axial 2D reconstructed images and   multiplanar 3D MIP reconstructed images of the head and neck vessels were performed by the technologist. Dose reduction techniques were used. All stenosis measurements made according to NASCET criteria unless otherwise specified.    FINDINGS:   NONCONTRAST HEAD CT:   INTRACRANIAL CONTENTS: No intracranial hemorrhage, extraaxial collection, or mass effect.  No CT evidence of acute infarct. Normal parenchymal attenuation. Normal ventricles and sulci. No suprasellar mass effect.    VISUALIZED ORBITS/SINUSES/MASTOIDS: No intraorbital abnormality. No significant paranasal sinus mucosal disease. No middle ear or mastoid effusion.    BONES/SOFT TISSUES: No acute abnormality.    HEAD CTA:  ANTERIOR CIRCULATION: No stenosis/occlusion, aneurysm, or high flow vascular malformation. Standard [place] anatomy.    POSTERIOR  CIRCULATION: No stenosis/occlusion, aneurysm, or high flow vascular malformation. Balanced vertebral arteries supply a normal basilar artery.     DURAL VENOUS SINUSES: Expected enhancement of the major dural venous sinuses.    NECK CTA:  RIGHT CAROTID: Calcified atherosclerotic plaque results in less than 25% stenosis in the proximal ICA. No dissection.    LEFT CAROTID: Soft atherosclerotic plaque results in less than 50% stenosis in the proximal ICA. No dissection.    VERTEBRAL ARTERIES: No focal stenosis or dissection. Balanced vertebral arteries.    AORTIC ARCH: Classic aortic arch anatomy with no significant stenosis at the origin of the great vessels.    NONVASCULAR STRUCTURES: Small thyroid nodules, not meeting size requirement for follow-up.      Impression    IMPRESSION:   HEAD CT:  1.  No acute intracranial process.    HEAD CTA:   1.  No large vessel occlusion or hemodynamically significant stenosis.    NECK CTA:  1.  No large vessel occlusion or hemodynamically significant stenosis.    Dr. Rui Emmanuel was contacted by me on 8/4/2023 8:35 PM CDT with the preliminary report.   CTA Head Neck with Contrast    Narrative    EXAM: CT HEAD W/O CONTRAST, CTA HEAD NECK W CONTRAST  LOCATION: [facility]  DATE/TIME: 8/4/2023 8:22 PM CDT    INDICATION: Code stroke; vision problem  COMPARISON: None.  CONTRAST: 68 mL of Isovue 370  TECHNIQUE: Head and neck CT angiogram with IV contrast. Noncontrast head CT followed by axial helical CT images of the head and neck vessels obtained during the arterial phase of intravenous contrast administration. Axial 2D reconstructed images and   multiplanar 3D MIP reconstructed images of the head and neck vessels were performed by the technologist. Dose reduction techniques were used. All stenosis measurements made according to NASCET criteria unless otherwise specified.    FINDINGS:   NONCONTRAST HEAD CT:   INTRACRANIAL CONTENTS: No intracranial hemorrhage,  extraaxial collection, or mass effect.  No CT evidence of acute infarct. Normal parenchymal attenuation. Normal ventricles and sulci. No suprasellar mass effect.    VISUALIZED ORBITS/SINUSES/MASTOIDS: No intraorbital abnormality. No significant paranasal sinus mucosal disease. No middle ear or mastoid effusion.    BONES/SOFT TISSUES: No acute abnormality.    HEAD CTA:  ANTERIOR CIRCULATION: No stenosis/occlusion, aneurysm, or high flow vascular malformation. Standard [place] anatomy.    POSTERIOR CIRCULATION: No stenosis/occlusion, aneurysm, or high flow vascular malformation. Balanced vertebral arteries supply a normal basilar artery.     DURAL VENOUS SINUSES: Expected enhancement of the major dural venous sinuses.    NECK CTA:  RIGHT CAROTID: Calcified atherosclerotic plaque results in less than 25% stenosis in the proximal ICA. No dissection.    LEFT CAROTID: Soft atherosclerotic plaque results in less than 50% stenosis in the proximal ICA. No dissection.    VERTEBRAL ARTERIES: No focal stenosis or dissection. Balanced vertebral arteries.    AORTIC ARCH: Classic aortic arch anatomy with no significant stenosis at the origin of the great vessels.    NONVASCULAR STRUCTURES: Small thyroid nodules, not meeting size requirement for follow-up.      Impression    IMPRESSION:   HEAD CT:  1.  No acute intracranial process.    HEAD CTA:   1.  No large vessel occlusion or hemodynamically significant stenosis.    NECK CTA:  1.  No large vessel occlusion or hemodynamically significant stenosis.    Dr. Rui Emmanuel was contacted by me on 8/4/2023 8:35 PM CDT with the preliminary report.       Medications   0.9% sodium chloride BOLUS (has no administration in time range)   sodium chloride 0.9% infusion (has no administration in time range)   iopamidol (ISOVUE-370) solution 68 mL (68 mLs Intravenous $Given 8/4/23 2013)   sodium chloride 0.9 % bag 500mL for CT scan flush use (100 mLs Intravenous $Given 8/4/23 2013)        Assessments & Plan (with Medical Decision Making)     MDM: Sujata Pickard is a 57 year old female who presents with acute onset vision change at 1600 today then was most consistent with vitreous detachment with floaters and a bedside ultrasound showing no signs of retinal detachment I had readied for discharge home but noted the elevated blood pressure and heart rate.  It was then that she also told me about a curtainlike closure that she was experiencing first briefly vertically lasting seconds and then horizontally lasting seconds at times.  He was at this point we called a code stroke for possible amaurosis fugax.  This was tier 2 and I did speak to neuro stroke.  Might suspicion was for vitreous detachment still but could not reconcile her symptoms.  Neuro stroke also agreed that this was most likely none amaurosis.  Her CT CTA was reassuring his ESR and CRP was normal.  I spoke with Dr. Cantrell following the imaging and given no obvious findings for retinal detachment should be able to wait until Monday to have a dilated eye exam at that time.  Again bedside ultrasound shows no sign of retinal detachment and she has no persistent visual deficits.  It is likely that her vitreous detachment is shifting at times and resulting in the sensation of the curtain closure type events.  She is given precautions for return for any persistent field cut that might suggest a retinal detachment at that point.  I have given precautions for return and additional recommendations as below.  She also was noted to have elevated blood pressure.  Heart rate came down to 106.  Other laboratory testing done related to this included thyroid testing.  Electrolytes were also done as part of the CVA work-up her EKG is sinus rhythm  I have reviewed the nursing notes.    I have reviewed the findings, diagnosis, plan and need for follow up with the patient.           Medical Decision Making  The patient's presentation was of  moderate complexity (an acute illness with systemic symptoms).    The patient's evaluation involved:  ordering and/or review of 3+ test(s) in this encounter (see separate area of note for details)  discussion of management or test interpretation with another health professional (stroke neuro, opthalmology)    The patient's management necessitated high risk (a decision regarding emergency major procedure (Stroke workup)).        New Prescriptions    No medications on file       Final diagnoses:   Floaters in visual field, left - given the sudden onset of floateres - all coming from the left visual field - suspect vitreous detachment.  on u/s I see the floaters, but no retinal detachment.  follow-up eye clinic.   Elevated blood pressure reading   Sinus tachycardia - stay hydrated. return imemd fro chest pain=, shortness of breath.  suspect this is related to the anxiousness of the new eye chganges, however with elevated bvlood pressure labs are in order including thyroid.  will defer these after our discussion to outpatient        8/4/2023   [facility] EMERGENCY DEPT       Rui Emmanuel MD  08/04/23 [id number]

## 2023-08-05 NOTE — ED TRIAGE NOTES
"Since ~ 16:00 today has been having a \"mist-like floater or ink spot\" across the left eye. Clear speech, normal gait, no arm drift, no facial droop. Denies trauma. Denies heart HX. Does admit a distant HX of blood pressure going up. Family HX of diabetes. Denies pain.         "

## 2023-08-05 NOTE — CONSULTS
"[facility]    Stroke Telephone Note    I was called by Rui Emmanuel on 08/04/23 regarding patient Sujata Pickard. The patient is a 57 year old female who presented with sudden onset \"mist-like floater or ink spot\" across the left eye. She also reported 2 episodes of painless vision loss on the left hardly lasting a few seconds. No other focal signs/symptoms.     BP (!) 186/93   Pulse (!) 124   Temp 99.1  F (37.3  C) (Oral)   Resp 18   Ht 1.6 m (5' 3\")   Wt 83.6 kg (184 lb 3.2 oz)   SpO2 97%   BMI 32.63 kg/m       Stroke Code Data (for stroke code without tele)  Stroke code activated 08/04/23 2007   Stroke provider first response  08/04/23 2010            Last known normal 08/04/23   1600        Time of discovery   (or onset of symptoms) 08/04/23   1600   Head CT read by Stroke Neuro Dr/Provider 08/04/23 2019   Was stroke code de-escalated? Yes 08/04/23 2021          Imaging Findings  CT head: No acute findings  CTA head/neck: No LVO, hemodynamically significant stenosis or dissection    Intravenous Thrombolysis  Not given due to:   - unclear or unfavorable risk-benefit profile for extended window thrombolysis beyond the conventional 4.5 hour time window    Endovascular Treatment  Not initiated due to absence of proximal vessel occlusion    Impression  Brief transient loss of vision on the left eye    Recommendations   Symptomatology do not seem to suggest amaurosis fugax given ultrashort duration  Low suspicion for neurovascular process, follow up with ophthalmology outpatient    My recommendations are based on the information provided over the phone by Sujata Pickard's in-person providers. They are not intended to replace the clinical judgment of her in-person providers. I was not requested to personally see or examine the patient at this time.    Андрей Vazquez MD       Department of Neurology       Securely message with the Vocera Web " "Console (learn more here)   To page me or covering stroke neurology team member, click here: [email or web address]  Choose \"On Call\" tab at top, then select \"NEUROLOGY/ALL SITES\" from middle drop-down box, press Enter, then look for \"stroke\" or \"telestroke\" for your site.        "